# Patient Record
Sex: FEMALE | Race: WHITE | NOT HISPANIC OR LATINO | Employment: OTHER | URBAN - METROPOLITAN AREA
[De-identification: names, ages, dates, MRNs, and addresses within clinical notes are randomized per-mention and may not be internally consistent; named-entity substitution may affect disease eponyms.]

---

## 2021-07-19 ENCOUNTER — HOSPITAL ENCOUNTER (INPATIENT)
Facility: MEDICAL CENTER | Age: 71
LOS: 2 days | DRG: 872 | End: 2021-07-21
Attending: EMERGENCY MEDICINE | Admitting: STUDENT IN AN ORGANIZED HEALTH CARE EDUCATION/TRAINING PROGRAM
Payer: COMMERCIAL

## 2021-07-19 ENCOUNTER — APPOINTMENT (OUTPATIENT)
Dept: RADIOLOGY | Facility: MEDICAL CENTER | Age: 71
DRG: 872 | End: 2021-07-19
Attending: EMERGENCY MEDICINE
Payer: COMMERCIAL

## 2021-07-19 DIAGNOSIS — Z85.79 HISTORY OF MULTIPLE MYELOMA: ICD-10-CM

## 2021-07-19 DIAGNOSIS — N39.0 ACUTE UTI: ICD-10-CM

## 2021-07-19 DIAGNOSIS — A41.9 SEPSIS WITHOUT ACUTE ORGAN DYSFUNCTION, DUE TO UNSPECIFIED ORGANISM (HCC): ICD-10-CM

## 2021-07-19 PROBLEM — C90.00 MULTIPLE MYELOMA (HCC): Status: ACTIVE | Noted: 2021-07-19

## 2021-07-19 PROBLEM — E66.9 OBESITY (BMI 35.0-39.9 WITHOUT COMORBIDITY): Status: ACTIVE | Noted: 2021-07-19

## 2021-07-19 PROBLEM — E66.09 CLASS 2 OBESITY DUE TO EXCESS CALORIES WITHOUT SERIOUS COMORBIDITY WITH BODY MASS INDEX (BMI) OF 35.0 TO 35.9 IN ADULT: Status: RESOLVED | Noted: 2021-07-19 | Resolved: 2021-07-19

## 2021-07-19 PROBLEM — N30.00 ACUTE CYSTITIS: Status: ACTIVE | Noted: 2021-07-19

## 2021-07-19 PROBLEM — D72.819 LEUKOPENIA: Status: ACTIVE | Noted: 2021-07-19

## 2021-07-19 PROBLEM — Z79.4 TYPE 2 DIABETES MELLITUS WITH DIABETIC POLYNEUROPATHY, WITH LONG-TERM CURRENT USE OF INSULIN (HCC): Status: ACTIVE | Noted: 2021-07-19

## 2021-07-19 PROBLEM — D69.6 THROMBOCYTOPENIA (HCC): Status: ACTIVE | Noted: 2021-07-19

## 2021-07-19 PROBLEM — R06.02 SHORTNESS OF BREATH: Status: ACTIVE | Noted: 2021-07-19

## 2021-07-19 PROBLEM — R74.01 TRANSAMINITIS: Status: ACTIVE | Noted: 2021-07-19

## 2021-07-19 PROBLEM — E66.09 CLASS 2 OBESITY DUE TO EXCESS CALORIES WITHOUT SERIOUS COMORBIDITY WITH BODY MASS INDEX (BMI) OF 35.0 TO 35.9 IN ADULT: Status: ACTIVE | Noted: 2021-07-19

## 2021-07-19 PROBLEM — E11.42 TYPE 2 DIABETES MELLITUS WITH DIABETIC POLYNEUROPATHY, WITH LONG-TERM CURRENT USE OF INSULIN (HCC): Status: ACTIVE | Noted: 2021-07-19

## 2021-07-19 PROBLEM — E87.20 METABOLIC ACIDOSIS: Status: ACTIVE | Noted: 2021-07-19

## 2021-07-19 LAB
ALBUMIN SERPL BCP-MCNC: 4.2 G/DL (ref 3.2–4.9)
ALBUMIN/GLOB SERPL: 1.6 G/DL
ALP SERPL-CCNC: 93 U/L (ref 30–99)
ALT SERPL-CCNC: 58 U/L (ref 2–50)
ANION GAP SERPL CALC-SCNC: 18 MMOL/L (ref 7–16)
APPEARANCE UR: ABNORMAL
AST SERPL-CCNC: 48 U/L (ref 12–45)
BACTERIA #/AREA URNS HPF: ABNORMAL /HPF
BASOPHILS # BLD AUTO: 0.2 % (ref 0–1.8)
BASOPHILS # BLD: 0.01 K/UL (ref 0–0.12)
BILIRUB SERPL-MCNC: 1.3 MG/DL (ref 0.1–1.5)
BILIRUB UR QL STRIP.AUTO: NEGATIVE
BUN SERPL-MCNC: 13 MG/DL (ref 8–22)
CALCIUM SERPL-MCNC: 9 MG/DL (ref 8.5–10.5)
CHLORIDE SERPL-SCNC: 102 MMOL/L (ref 96–112)
CO2 SERPL-SCNC: 16 MMOL/L (ref 20–33)
COLOR UR: YELLOW
CREAT SERPL-MCNC: 0.94 MG/DL (ref 0.5–1.4)
EOSINOPHIL # BLD AUTO: 0.16 K/UL (ref 0–0.51)
EOSINOPHIL NFR BLD: 4 % (ref 0–6.9)
EPI CELLS #/AREA URNS HPF: NEGATIVE /HPF
ERYTHROCYTE [DISTWIDTH] IN BLOOD BY AUTOMATED COUNT: 54.5 FL (ref 35.9–50)
EST. AVERAGE GLUCOSE BLD GHB EST-MCNC: 91 MG/DL
FLUAV RNA SPEC QL NAA+PROBE: NEGATIVE
FLUBV RNA SPEC QL NAA+PROBE: NEGATIVE
GLOBULIN SER CALC-MCNC: 2.7 G/DL (ref 1.9–3.5)
GLUCOSE SERPL-MCNC: 133 MG/DL (ref 65–99)
GLUCOSE UR STRIP.AUTO-MCNC: NEGATIVE MG/DL
HBA1C MFR BLD: 4.8 % (ref 4–5.6)
HCT VFR BLD AUTO: 37.6 % (ref 37–47)
HGB BLD-MCNC: 12.8 G/DL (ref 12–16)
HYALINE CASTS #/AREA URNS LPF: ABNORMAL /LPF
IMM GRANULOCYTES # BLD AUTO: 0.03 K/UL (ref 0–0.11)
IMM GRANULOCYTES NFR BLD AUTO: 0.7 % (ref 0–0.9)
KETONES UR STRIP.AUTO-MCNC: ABNORMAL MG/DL
LACTATE BLD-SCNC: 2.8 MMOL/L (ref 0.5–2)
LEUKOCYTE ESTERASE UR QL STRIP.AUTO: ABNORMAL
LYMPHOCYTES # BLD AUTO: 0.56 K/UL (ref 1–4.8)
LYMPHOCYTES NFR BLD: 14 % (ref 22–41)
MAGNESIUM SERPL-MCNC: 1.8 MG/DL (ref 1.5–2.5)
MCH RBC QN AUTO: 32.2 PG (ref 27–33)
MCHC RBC AUTO-ENTMCNC: 34 G/DL (ref 33.6–35)
MCV RBC AUTO: 94.5 FL (ref 81.4–97.8)
MICRO URNS: ABNORMAL
MONOCYTES # BLD AUTO: 0.69 K/UL (ref 0–0.85)
MONOCYTES NFR BLD AUTO: 17.2 % (ref 0–13.4)
NEUTROPHILS # BLD AUTO: 2.56 K/UL (ref 2–7.15)
NEUTROPHILS NFR BLD: 63.9 % (ref 44–72)
NITRITE UR QL STRIP.AUTO: NEGATIVE
NRBC # BLD AUTO: 0 K/UL
NRBC BLD-RTO: 0 /100 WBC
PH UR STRIP.AUTO: 5.5 [PH] (ref 5–8)
PLATELET # BLD AUTO: 102 K/UL (ref 164–446)
PMV BLD AUTO: 11.6 FL (ref 9–12.9)
POTASSIUM SERPL-SCNC: 3.7 MMOL/L (ref 3.6–5.5)
PROCALCITONIN SERPL-MCNC: 0.08 NG/ML
PROT SERPL-MCNC: 6.9 G/DL (ref 6–8.2)
PROT UR QL STRIP: 30 MG/DL
RBC # BLD AUTO: 3.98 M/UL (ref 4.2–5.4)
RBC # URNS HPF: ABNORMAL /HPF
RBC UR QL AUTO: ABNORMAL
RSV RNA SPEC QL NAA+PROBE: NEGATIVE
SARS-COV-2 RNA RESP QL NAA+PROBE: NOTDETECTED
SODIUM SERPL-SCNC: 136 MMOL/L (ref 135–145)
SP GR UR STRIP.AUTO: 1.01
SPECIMEN SOURCE: NORMAL
UROBILINOGEN UR STRIP.AUTO-MCNC: 0.2 MG/DL
WBC # BLD AUTO: 4 K/UL (ref 4.8–10.8)
WBC #/AREA URNS HPF: ABNORMAL /HPF

## 2021-07-19 PROCEDURE — 99223 1ST HOSP IP/OBS HIGH 75: CPT | Performed by: STUDENT IN AN ORGANIZED HEALTH CARE EDUCATION/TRAINING PROGRAM

## 2021-07-19 PROCEDURE — 87086 URINE CULTURE/COLONY COUNT: CPT

## 2021-07-19 PROCEDURE — 83735 ASSAY OF MAGNESIUM: CPT

## 2021-07-19 PROCEDURE — 87077 CULTURE AEROBIC IDENTIFY: CPT

## 2021-07-19 PROCEDURE — 700111 HCHG RX REV CODE 636 W/ 250 OVERRIDE (IP): Performed by: EMERGENCY MEDICINE

## 2021-07-19 PROCEDURE — 83605 ASSAY OF LACTIC ACID: CPT | Mod: 91

## 2021-07-19 PROCEDURE — 87186 SC STD MICRODIL/AGAR DIL: CPT

## 2021-07-19 PROCEDURE — 700105 HCHG RX REV CODE 258: Performed by: EMERGENCY MEDICINE

## 2021-07-19 PROCEDURE — 0241U HCHG SARS-COV-2 COVID-19 NFCT DS RESP RNA 4 TRGT MIC: CPT

## 2021-07-19 PROCEDURE — 99285 EMERGENCY DEPT VISIT HI MDM: CPT

## 2021-07-19 PROCEDURE — 80053 COMPREHEN METABOLIC PANEL: CPT

## 2021-07-19 PROCEDURE — 83036 HEMOGLOBIN GLYCOSYLATED A1C: CPT

## 2021-07-19 PROCEDURE — 96365 THER/PROPH/DIAG IV INF INIT: CPT

## 2021-07-19 PROCEDURE — 770006 HCHG ROOM/CARE - MED/SURG/GYN SEMI*

## 2021-07-19 PROCEDURE — 87040 BLOOD CULTURE FOR BACTERIA: CPT

## 2021-07-19 PROCEDURE — 81001 URINALYSIS AUTO W/SCOPE: CPT

## 2021-07-19 PROCEDURE — 85025 COMPLETE CBC W/AUTO DIFF WBC: CPT

## 2021-07-19 PROCEDURE — C9803 HOPD COVID-19 SPEC COLLECT: HCPCS | Performed by: EMERGENCY MEDICINE

## 2021-07-19 PROCEDURE — 84145 PROCALCITONIN (PCT): CPT

## 2021-07-19 PROCEDURE — 71045 X-RAY EXAM CHEST 1 VIEW: CPT

## 2021-07-19 PROCEDURE — 96375 TX/PRO/DX INJ NEW DRUG ADDON: CPT

## 2021-07-19 RX ORDER — LENALIDOMIDE 10 MG/1
10 CAPSULE ORAL
COMMUNITY

## 2021-07-19 RX ORDER — GABAPENTIN 300 MG/1
300 CAPSULE ORAL
Status: DISCONTINUED | OUTPATIENT
Start: 2021-07-19 | End: 2021-07-21 | Stop reason: HOSPADM

## 2021-07-19 RX ORDER — SODIUM CHLORIDE, SODIUM LACTATE, POTASSIUM CHLORIDE, CALCIUM CHLORIDE 600; 310; 30; 20 MG/100ML; MG/100ML; MG/100ML; MG/100ML
INJECTION, SOLUTION INTRAVENOUS CONTINUOUS
Status: ACTIVE | OUTPATIENT
Start: 2021-07-19 | End: 2021-07-20

## 2021-07-19 RX ORDER — INSULIN GLARGINE 100 [IU]/ML
7 INJECTION, SOLUTION SUBCUTANEOUS EVERY EVENING
Status: DISCONTINUED | OUTPATIENT
Start: 2021-07-20 | End: 2021-07-20

## 2021-07-19 RX ORDER — OXYCODONE HYDROCHLORIDE 5 MG/1
5 TABLET ORAL EVERY 4 HOURS PRN
Status: DISCONTINUED | OUTPATIENT
Start: 2021-07-19 | End: 2021-07-21 | Stop reason: HOSPADM

## 2021-07-19 RX ORDER — LETROZOLE 2.5 MG/1
2.5 TABLET, FILM COATED ORAL EVERY EVENING
Status: DISCONTINUED | OUTPATIENT
Start: 2021-07-20 | End: 2021-07-21 | Stop reason: HOSPADM

## 2021-07-19 RX ORDER — AMOXICILLIN 250 MG
2 CAPSULE ORAL 2 TIMES DAILY
Status: DISCONTINUED | OUTPATIENT
Start: 2021-07-20 | End: 2021-07-21 | Stop reason: HOSPADM

## 2021-07-19 RX ORDER — DEXTROSE MONOHYDRATE 25 G/50ML
50 INJECTION, SOLUTION INTRAVENOUS
Status: DISCONTINUED | OUTPATIENT
Start: 2021-07-19 | End: 2021-07-21 | Stop reason: HOSPADM

## 2021-07-19 RX ORDER — POLYETHYLENE GLYCOL 3350 17 G/17G
1 POWDER, FOR SOLUTION ORAL
Status: DISCONTINUED | OUTPATIENT
Start: 2021-07-19 | End: 2021-07-21 | Stop reason: HOSPADM

## 2021-07-19 RX ORDER — SODIUM CHLORIDE, SODIUM LACTATE, POTASSIUM CHLORIDE, CALCIUM CHLORIDE 600; 310; 30; 20 MG/100ML; MG/100ML; MG/100ML; MG/100ML
1000 INJECTION, SOLUTION INTRAVENOUS ONCE
Status: COMPLETED | OUTPATIENT
Start: 2021-07-19 | End: 2021-07-19

## 2021-07-19 RX ORDER — LENALIDOMIDE 10 MG/1
10 CAPSULE ORAL
Status: DISCONTINUED | OUTPATIENT
Start: 2021-07-19 | End: 2021-07-19

## 2021-07-19 RX ORDER — LABETALOL HYDROCHLORIDE 5 MG/ML
10 INJECTION, SOLUTION INTRAVENOUS EVERY 4 HOURS PRN
Status: DISCONTINUED | OUTPATIENT
Start: 2021-07-19 | End: 2021-07-21 | Stop reason: HOSPADM

## 2021-07-19 RX ORDER — ENALAPRILAT 1.25 MG/ML
1.25 INJECTION INTRAVENOUS EVERY 6 HOURS PRN
Status: DISCONTINUED | OUTPATIENT
Start: 2021-07-19 | End: 2021-07-21 | Stop reason: HOSPADM

## 2021-07-19 RX ORDER — HYDROMORPHONE HYDROCHLORIDE 1 MG/ML
0.5 INJECTION, SOLUTION INTRAMUSCULAR; INTRAVENOUS; SUBCUTANEOUS
Status: DISCONTINUED | OUTPATIENT
Start: 2021-07-19 | End: 2021-07-21 | Stop reason: HOSPADM

## 2021-07-19 RX ORDER — BISACODYL 10 MG
10 SUPPOSITORY, RECTAL RECTAL
Status: DISCONTINUED | OUTPATIENT
Start: 2021-07-19 | End: 2021-07-21 | Stop reason: HOSPADM

## 2021-07-19 RX ORDER — INSULIN GLARGINE 100 [IU]/ML
7 INJECTION, SOLUTION SUBCUTANEOUS EVERY EVENING
COMMUNITY

## 2021-07-19 RX ORDER — LETROZOLE 2.5 MG/1
2.5 TABLET, FILM COATED ORAL EVERY EVENING
COMMUNITY

## 2021-07-19 RX ORDER — VITAMIN B COMPLEX
1000 TABLET ORAL EVERY EVENING
COMMUNITY

## 2021-07-19 RX ORDER — AZITHROMYCIN 500 MG/1
500 INJECTION, POWDER, LYOPHILIZED, FOR SOLUTION INTRAVENOUS ONCE
Status: COMPLETED | OUTPATIENT
Start: 2021-07-19 | End: 2021-07-19

## 2021-07-19 RX ORDER — GABAPENTIN 300 MG/1
300 CAPSULE ORAL
COMMUNITY

## 2021-07-19 RX ORDER — ACETAMINOPHEN 325 MG/1
650 TABLET ORAL EVERY 6 HOURS PRN
Status: DISCONTINUED | OUTPATIENT
Start: 2021-07-19 | End: 2021-07-21 | Stop reason: HOSPADM

## 2021-07-19 RX ADMIN — AZITHROMYCIN MONOHYDRATE 500 MG: 500 INJECTION, POWDER, LYOPHILIZED, FOR SOLUTION INTRAVENOUS at 22:42

## 2021-07-19 RX ADMIN — SODIUM CHLORIDE, POTASSIUM CHLORIDE, SODIUM LACTATE AND CALCIUM CHLORIDE 1000 ML: 600; 310; 30; 20 INJECTION, SOLUTION INTRAVENOUS at 22:48

## 2021-07-19 RX ADMIN — CEFTRIAXONE SODIUM 2 G: 10 INJECTION, POWDER, FOR SOLUTION INTRAVENOUS at 22:42

## 2021-07-19 ASSESSMENT — ENCOUNTER SYMPTOMS
INSOMNIA: 0
HEARTBURN: 0
BACK PAIN: 0
FOCAL WEAKNESS: 0
DOUBLE VISION: 0
FLANK PAIN: 0
VOMITING: 0
NAUSEA: 0
BRUISES/BLEEDS EASILY: 0
LOSS OF CONSCIOUSNESS: 0
SORE THROAT: 0
COUGH: 0
CHILLS: 1
NECK PAIN: 0
CONSTIPATION: 0
ABDOMINAL PAIN: 1
BLOOD IN STOOL: 0
DIARRHEA: 0
HEADACHES: 0
WHEEZING: 0
FEVER: 1
DEPRESSION: 0
WEAKNESS: 0
SHORTNESS OF BREATH: 1
BLURRED VISION: 0
PALPITATIONS: 0

## 2021-07-20 LAB
ALBUMIN SERPL BCP-MCNC: 3.9 G/DL (ref 3.2–4.9)
ALBUMIN/GLOB SERPL: 1.6 G/DL
ALP SERPL-CCNC: 82 U/L (ref 30–99)
ALT SERPL-CCNC: 52 U/L (ref 2–50)
ANION GAP SERPL CALC-SCNC: 14 MMOL/L (ref 7–16)
AST SERPL-CCNC: 37 U/L (ref 12–45)
BASOPHILS # BLD AUTO: 0.4 % (ref 0–1.8)
BASOPHILS # BLD: 0.01 K/UL (ref 0–0.12)
BILIRUB SERPL-MCNC: 0.6 MG/DL (ref 0.1–1.5)
BUN SERPL-MCNC: 12 MG/DL (ref 8–22)
CALCIUM SERPL-MCNC: 8.6 MG/DL (ref 8.5–10.5)
CHLORIDE SERPL-SCNC: 108 MMOL/L (ref 96–112)
CO2 SERPL-SCNC: 20 MMOL/L (ref 20–33)
CREAT SERPL-MCNC: 0.85 MG/DL (ref 0.5–1.4)
EOSINOPHIL # BLD AUTO: 0.11 K/UL (ref 0–0.51)
EOSINOPHIL NFR BLD: 3.9 % (ref 0–6.9)
ERYTHROCYTE [DISTWIDTH] IN BLOOD BY AUTOMATED COUNT: 56.9 FL (ref 35.9–50)
GLOBULIN SER CALC-MCNC: 2.4 G/DL (ref 1.9–3.5)
GLUCOSE BLD-MCNC: 100 MG/DL (ref 65–99)
GLUCOSE BLD-MCNC: 100 MG/DL (ref 65–99)
GLUCOSE BLD-MCNC: 118 MG/DL (ref 65–99)
GLUCOSE BLD-MCNC: 153 MG/DL (ref 65–99)
GLUCOSE SERPL-MCNC: 137 MG/DL (ref 65–99)
HCT VFR BLD AUTO: 34.6 % (ref 37–47)
HGB BLD-MCNC: 11.3 G/DL (ref 12–16)
IMM GRANULOCYTES # BLD AUTO: 0.02 K/UL (ref 0–0.11)
IMM GRANULOCYTES NFR BLD AUTO: 0.7 % (ref 0–0.9)
INR PPP: 1.07 (ref 0.87–1.13)
LACTATE BLD-SCNC: 1.4 MMOL/L (ref 0.5–2)
LACTATE BLD-SCNC: 1.7 MMOL/L (ref 0.5–2)
LACTATE BLD-SCNC: 2 MMOL/L (ref 0.5–2)
LACTATE BLD-SCNC: 2.4 MMOL/L (ref 0.5–2)
LYMPHOCYTES # BLD AUTO: 0.52 K/UL (ref 1–4.8)
LYMPHOCYTES NFR BLD: 18.5 % (ref 22–41)
MCH RBC QN AUTO: 31.7 PG (ref 27–33)
MCHC RBC AUTO-ENTMCNC: 32.7 G/DL (ref 33.6–35)
MCV RBC AUTO: 96.9 FL (ref 81.4–97.8)
MONOCYTES # BLD AUTO: 0.42 K/UL (ref 0–0.85)
MONOCYTES NFR BLD AUTO: 14.9 % (ref 0–13.4)
NEUTROPHILS # BLD AUTO: 1.73 K/UL (ref 2–7.15)
NEUTROPHILS NFR BLD: 61.6 % (ref 44–72)
NRBC # BLD AUTO: 0 K/UL
NRBC BLD-RTO: 0 /100 WBC
PLATELET # BLD AUTO: 90 K/UL (ref 164–446)
PMV BLD AUTO: 11.6 FL (ref 9–12.9)
POTASSIUM SERPL-SCNC: 3.5 MMOL/L (ref 3.6–5.5)
PROT SERPL-MCNC: 6.3 G/DL (ref 6–8.2)
PROTHROMBIN TIME: 13.6 SEC (ref 12–14.6)
RBC # BLD AUTO: 3.57 M/UL (ref 4.2–5.4)
SODIUM SERPL-SCNC: 142 MMOL/L (ref 135–145)
WBC # BLD AUTO: 2.8 K/UL (ref 4.8–10.8)

## 2021-07-20 PROCEDURE — 700111 HCHG RX REV CODE 636 W/ 250 OVERRIDE (IP): Performed by: STUDENT IN AN ORGANIZED HEALTH CARE EDUCATION/TRAINING PROGRAM

## 2021-07-20 PROCEDURE — 82962 GLUCOSE BLOOD TEST: CPT | Mod: 91

## 2021-07-20 PROCEDURE — A9270 NON-COVERED ITEM OR SERVICE: HCPCS | Performed by: STUDENT IN AN ORGANIZED HEALTH CARE EDUCATION/TRAINING PROGRAM

## 2021-07-20 PROCEDURE — 700111 HCHG RX REV CODE 636 W/ 250 OVERRIDE (IP): Performed by: INTERNAL MEDICINE

## 2021-07-20 PROCEDURE — A9270 NON-COVERED ITEM OR SERVICE: HCPCS | Performed by: INTERNAL MEDICINE

## 2021-07-20 PROCEDURE — 99232 SBSQ HOSP IP/OBS MODERATE 35: CPT | Performed by: INTERNAL MEDICINE

## 2021-07-20 PROCEDURE — 36415 COLL VENOUS BLD VENIPUNCTURE: CPT

## 2021-07-20 PROCEDURE — 700102 HCHG RX REV CODE 250 W/ 637 OVERRIDE(OP): Performed by: INTERNAL MEDICINE

## 2021-07-20 PROCEDURE — 85610 PROTHROMBIN TIME: CPT

## 2021-07-20 PROCEDURE — 94760 N-INVAS EAR/PLS OXIMETRY 1: CPT

## 2021-07-20 PROCEDURE — 700102 HCHG RX REV CODE 250 W/ 637 OVERRIDE(OP): Performed by: STUDENT IN AN ORGANIZED HEALTH CARE EDUCATION/TRAINING PROGRAM

## 2021-07-20 PROCEDURE — 85025 COMPLETE CBC W/AUTO DIFF WBC: CPT

## 2021-07-20 PROCEDURE — 83605 ASSAY OF LACTIC ACID: CPT | Mod: 91

## 2021-07-20 PROCEDURE — 770006 HCHG ROOM/CARE - MED/SURG/GYN SEMI*

## 2021-07-20 PROCEDURE — 80053 COMPREHEN METABOLIC PANEL: CPT

## 2021-07-20 PROCEDURE — 700105 HCHG RX REV CODE 258: Performed by: STUDENT IN AN ORGANIZED HEALTH CARE EDUCATION/TRAINING PROGRAM

## 2021-07-20 RX ORDER — POTASSIUM CHLORIDE 20 MEQ/1
40 TABLET, EXTENDED RELEASE ORAL ONCE
Status: COMPLETED | OUTPATIENT
Start: 2021-07-20 | End: 2021-07-20

## 2021-07-20 RX ORDER — MAGNESIUM SULFATE HEPTAHYDRATE 40 MG/ML
2 INJECTION, SOLUTION INTRAVENOUS ONCE
Status: COMPLETED | OUTPATIENT
Start: 2021-07-20 | End: 2021-07-20

## 2021-07-20 RX ADMIN — LETROZOLE 2.5 MG: 2.5 TABLET ORAL at 18:41

## 2021-07-20 RX ADMIN — ENOXAPARIN SODIUM 40 MG: 40 INJECTION SUBCUTANEOUS at 06:41

## 2021-07-20 RX ADMIN — SODIUM CHLORIDE, POTASSIUM CHLORIDE, SODIUM LACTATE AND CALCIUM CHLORIDE: 600; 310; 30; 20 INJECTION, SOLUTION INTRAVENOUS at 00:15

## 2021-07-20 RX ADMIN — POTASSIUM CHLORIDE 40 MEQ: 1500 TABLET, EXTENDED RELEASE ORAL at 11:44

## 2021-07-20 RX ADMIN — MAGNESIUM SULFATE HEPTAHYDRATE 2 G: 2 INJECTION, SOLUTION INTRAVENOUS at 13:45

## 2021-07-20 RX ADMIN — GABAPENTIN 300 MG: 300 CAPSULE ORAL at 21:25

## 2021-07-20 RX ADMIN — CEFTRIAXONE SODIUM 2 G: 10 INJECTION, POWDER, FOR SOLUTION INTRAVENOUS at 21:25

## 2021-07-20 ASSESSMENT — COGNITIVE AND FUNCTIONAL STATUS - GENERAL
MOBILITY SCORE: 23
DAILY ACTIVITIY SCORE: 24
SUGGESTED CMS G CODE MODIFIER DAILY ACTIVITY: CH
CLIMB 3 TO 5 STEPS WITH RAILING: A LITTLE
SUGGESTED CMS G CODE MODIFIER MOBILITY: CI

## 2021-07-20 ASSESSMENT — PATIENT HEALTH QUESTIONNAIRE - PHQ9
2. FEELING DOWN, DEPRESSED, IRRITABLE, OR HOPELESS: NOT AT ALL
1. LITTLE INTEREST OR PLEASURE IN DOING THINGS: NOT AT ALL
SUM OF ALL RESPONSES TO PHQ9 QUESTIONS 1 AND 2: 0

## 2021-07-20 ASSESSMENT — LIFESTYLE VARIABLES
TOTAL SCORE: 0
HAVE PEOPLE ANNOYED YOU BY CRITICIZING YOUR DRINKING: NO
TOTAL SCORE: 0
CONSUMPTION TOTAL: NEGATIVE
EVER HAD A DRINK FIRST THING IN THE MORNING TO STEADY YOUR NERVES TO GET RID OF A HANGOVER: NO
AVERAGE NUMBER OF DAYS PER WEEK YOU HAVE A DRINK CONTAINING ALCOHOL: 0
ALCOHOL_USE: NO
DOES PATIENT WANT TO STOP DRINKING: NO
HOW MANY TIMES IN THE PAST YEAR HAVE YOU HAD 5 OR MORE DRINKS IN A DAY: 0
EVER FELT BAD OR GUILTY ABOUT YOUR DRINKING: NO
ON A TYPICAL DAY WHEN YOU DRINK ALCOHOL HOW MANY DRINKS DO YOU HAVE: 0
HAVE YOU EVER FELT YOU SHOULD CUT DOWN ON YOUR DRINKING: NO
TOTAL SCORE: 0

## 2021-07-20 NOTE — ED NOTES
Med rec updated and complete. Allergies reviewed. VIA interview with pt at bedside. Pt denies antibiotic use in last 30 days     Last doses of medications  07/19/21 2030.  Family will bring in REVLIMID if needed.    Home [facility]   [phone]           Current Outpatient Medications on File Prior to Encounter   Medication Sig Dispense Refill   • letrozole (FEMARA) 2.5 MG Tab Take 2.5 mg by mouth every evening.     • gabapentin (NEURONTIN) 300 MG Cap Take 300 mg by mouth at bedtime.     • Lenalidomide (REVLIMID) 10 MG Cap Take 10 mg by mouth at bedtime.     • insulin lispro (HUMALOG) 100 UNIT/ML Inject 3 Units under the skin 4 times a day as needed for High Blood Sugar. For FSBS > 120     • insulin glargine (LANTUS) 100 UNIT/ML Solution Inject 7 Units under the skin every evening.     • Calcium-Magnesium-Vitamin D (CALCIUM MAGNESIUM PO) Take 1 tablet by mouth 2 times a day.     • vitamin D (CHOLECALCIFEROL) 1000 Unit (25 mcg) Tab Take 1,000 Units by mouth every evening.

## 2021-07-20 NOTE — CARE PLAN
Problem: Knowledge Deficit - Standard  Goal: Patient and family/care givers will demonstrate understanding of plan of care, disease process/condition, diagnostic tests and medications  Outcome: Progressing   The patient is Stable - Low risk of patient condition declining or worsening         Progress made toward(s) clinical / shift goals:  Went over POC.    Patient is not progressing towards the following goals:

## 2021-07-20 NOTE — H&P
Hospital Medicine History & Physical Note    Date of Service  7/19/2021    Primary Care Physician  No primary care provider on file.    Consultants  None    Code Status  Full Code    Chief Complaint  Chief Complaint   Patient presents with   • Fever     pt states she had a 101 fever at home, pt has multiple myeloma, had a bone marrow transplant in october.    • Difficulty Urinating     pt states for a week she has been having difficulting urinating, pt states hx of utis.    • Painful Urination   • Diarrhea     pt states this has been happening for a month       History of Presenting Illness  Bridget Soto is a 71 y.o. female who presented 7/19/2021 with complaints of dysuria, fever of 101 at home and dehydration.  Patient states that she has been complaining of fever and chills which brought her into the hospital.  She states she has a history of multiple myeloma and is treated in [place].  She has not been taking any recent antibiotic therapy.  Patient states that she has been vaccinated x2 for COVID-19.  She denies any anosmia, ageusia, night sweats, cough with sputum production however does admit to shortness of breath.  She had chest x-ray performed and reveals hazy interstitial right midlung density suggests subtle infiltrate.  She denies cough with sputum production and hemoptysis.  She states that she has low platelets chronically from her multiple myeloma treatment.  She otherwise denies at time of evaluation: Constipation, melena, hematochezia, hematuria, incoordination, vertigo, syncope, visual changes.  Patient does complain of sore on the right cheek on the inside of her mouth  (see picture below), denies any trauma to inside of mouth.      Vital signs at time of admission were as follows: 96.6, 110, 18, 130/79, 97% room air.    Labs were obtained on admission and white blood cell count of 4.0, platelet 102 and otherwise relatively unremarkable.  Lactic acid was noted to be 2.8 and urinalysis  reveals cloudy urine with somewhat concentrated urine with specific gravity 1.015 and large leukocyte Estrace with trace occult blood, packed pyuria on microscopic analysis with moderate bacteria quantified.    Patient does have anion gap metabolic acidosis with anion gap of 18 CO2 of 16 on CMP and also has mild transaminitis with AST of 48 and ALT of 58 otherwise relatively unremarkable CMP.  Influenza A/B/RSV/Covid were tested for and negative.    Blood cultures obtained in ED and IV antibiotics initiated.  ERP consulted with myself for admission for sepsis secondary to urinary tract infection.  Patient agrees to full CODE STATUS at time of evaluation and alert and oriented x4.  She will be optimized in ED and subsequently transferred to medical alicia floor for further optimization of medical management.    I discussed the plan of care with patient.    Review of Systems  Review of Systems   Constitutional: Positive for chills and fever.   HENT: Negative for congestion and sore throat.         Sore in mouth   Eyes: Negative for blurred vision and double vision.   Respiratory: Positive for shortness of breath. Negative for cough and wheezing.    Cardiovascular: Negative for chest pain and palpitations.   Gastrointestinal: Positive for abdominal pain. Negative for blood in stool, constipation, diarrhea, heartburn, melena, nausea and vomiting.   Genitourinary: Positive for dysuria. Negative for flank pain and frequency.   Musculoskeletal: Negative for back pain and neck pain.   Skin: Negative for itching and rash.   Neurological: Negative for focal weakness, loss of consciousness, weakness and headaches.   Endo/Heme/Allergies: Negative for environmental allergies. Does not bruise/bleed easily.   Psychiatric/Behavioral: Negative for depression. The patient does not have insomnia.        Past Medical History   has a past medical history of Cancer (HCC) and Diabetes (HCC).    Surgical History   has no past surgical  history on file.     Family History  family history includes Cancer in her mother; Heart Disease in her father.   Family history reviewed with patient. There is no family history that is pertinent to the chief complaint.     Social History   reports that she has never smoked. She has never used smokeless tobacco. She reports previous alcohol use. She reports that she does not use drugs.    Allergies  Allergies   Allergen Reactions   • Sulfa Drugs      rash       Medications  Prior to Admission Medications   Prescriptions Last Dose Informant Patient Reported? Taking?   Calcium-Magnesium-Vitamin D (CALCIUM MAGNESIUM PO) 7/19/2021 at 2030 Patient Yes Yes   Sig: Take 1 tablet by mouth 2 times a day.   Lenalidomide (REVLIMID) 10 MG Cap 7/19/2021 at 2030 Patient Yes Yes   Sig: Take 10 mg by mouth at bedtime.   gabapentin (NEURONTIN) 300 MG Cap 7/19/2021 at 2030 Patient Yes Yes   Sig: Take 300 mg by mouth at bedtime.   insulin glargine (LANTUS) 100 UNIT/ML Solution 7/19/2021 at 2030 Patient Yes Yes   Sig: Inject 7 Units under the skin every evening.   insulin lispro (HUMALOG) 100 UNIT/ML 7/19/2021 at 0800 Patient Yes Yes   Sig: Inject 3 Units under the skin 4 times a day as needed for High Blood Sugar. For FSBS > 120   letrozole (FEMARA) 2.5 MG Tab 7/19/2021 at 2030 Patient Yes Yes   Sig: Take 2.5 mg by mouth every evening.   vitamin D (CHOLECALCIFEROL) 1000 Unit (25 mcg) Tab 7/19/2021 at 2030 Patient Yes Yes   Sig: Take 1,000 Units by mouth every evening.      Facility-Administered Medications: None       Physical Exam  Temp:  [35.9 °C (96.6 °F)] 35.9 °C (96.6 °F)  Pulse:  [110] 110  Resp:  [18] 18  BP: (130)/(79) 130/79  SpO2:  [97 %] 97 %    Physical Exam  Constitutional:       General: She is not in acute distress.     Appearance: Normal appearance. She is not ill-appearing, toxic-appearing or diaphoretic.   HENT:      Head: Normocephalic and atraumatic.      Mouth/Throat:      Mouth: Mucous membranes are dry.       Pharynx: Oropharynx is clear. No posterior oropharyngeal erythema.      Comments: Oral lesion appreciated on right cheek consistent with purpura see image below  Eyes:      General: No scleral icterus.     Extraocular Movements: Extraocular movements intact.      Conjunctiva/sclera: Conjunctivae normal.      Pupils: Pupils are equal, round, and reactive to light.   Neck:      Vascular: No carotid bruit.   Cardiovascular:      Rate and Rhythm: Normal rate and regular rhythm.      Pulses: Normal pulses.      Heart sounds: Normal heart sounds. No murmur heard.   No friction rub. No gallop.    Pulmonary:      Effort: Pulmonary effort is normal.      Breath sounds: Normal breath sounds. No wheezing, rhonchi or rales.   Abdominal:      General: Abdomen is flat. Bowel sounds are normal. There is no distension.      Palpations: There is no mass.      Tenderness: There is abdominal tenderness (suprapubic tenderness). There is no rebound.   Musculoskeletal:         General: No swelling. Normal range of motion.      Cervical back: Normal range of motion.      Right lower leg: No edema.      Left lower leg: No edema.   Lymphadenopathy:      Cervical: No cervical adenopathy.   Skin:     General: Skin is warm and dry.      Capillary Refill: Capillary refill takes less than 2 seconds.      Findings: No erythema or rash.   Neurological:      General: No focal deficit present.      Mental Status: She is alert and oriented to person, place, and time. Mental status is at baseline.      Cranial Nerves: No cranial nerve deficit.      Sensory: No sensory deficit.      Motor: No weakness.   Psychiatric:         Mood and Affect: Mood normal.         Behavior: Behavior normal.         Laboratory:  Recent Labs     07/19/21 2127   WBC 4.0*   RBC 3.98*   HEMOGLOBIN 12.8   HEMATOCRIT 37.6   MCV 94.5   MCH 32.2   MCHC 34.0   RDW 54.5*   PLATELETCT 102*   MPV 11.6     Recent Labs     07/19/21 2127   SODIUM 136   POTASSIUM 3.7   CHLORIDE 102    CO2 16*   GLUCOSE 133*   BUN 13   CREATININE 0.94   CALCIUM 9.0     Recent Labs     07/19/21 2127   ALTSGPT 58*   ASTSGOT 48*   ALKPHOSPHAT 93   TBILIRUBIN 1.3   GLUCOSE 133*         No results for input(s): NTPROBNP in the last 72 hours.      No results for input(s): TROPONINT in the last 72 hours.    Imaging:  DX-CHEST-PORTABLE (1 VIEW)   Final Result         1.  Hazy interstitial right midlung density suggests subtle infiltrate.        I reviewed and interpreted the above chest x-ray and appreciate somewhat flattened diaphragm, possible infiltrative process on right costophrenic angle as seen above.  Lytic lesions appreciated on ribs.        Assessment/Plan:  I anticipate this patient will require at least two midnights for appropriate medical management, necessitating inpatient admission.    * Sepsis (HCC)- (present on admission)  Assessment & Plan  This is Sepsis Present on admission  SIRS criteria identified on my evaluation include: Hypothermia, with temperature less than 96.8 deg F, Tachycardia, with heart rate greater than 90 BPM and Leukopenia, with WBC less than 4,000  Source is Urinary  Sepsis protocol initiated  Fluid resuscitation ordered per protocol  IV antibiotics as appropriate for source of sepsis  While organ dysfunction may be noted elsewhere in this problem list or in the chart, degree of organ dysfunction does not meet CMS criteria for severe sepsis  Procalcitonin ordered and pending, urine culture ordered and pending.  IV antibiotics initiated in ED and we will continue onward.        Acute cystitis- (present on admission)  Assessment & Plan  Urinalysis reveals significant pyuria and moderate bacteria on microscopic analysis consistent with symptomatic dysuria and infection  IV antibiotics initiated in ED  Urine culture ordered and pending result    Shortness of breath- (present on admission)  Assessment & Plan  Azithromycin given in ED  Procalcitonin ordered pending  Chest x-ray reveals  ill-defined opacity in right costophrenic border    Obesity (BMI 35.0-39.9 without comorbidity)- (present on admission)  Assessment & Plan  Recommend lifestyle modifications and follow-up with PCP for diet and exercise regimen.    Type 2 diabetes mellitus with diabetic polyneuropathy, with long-term current use of insulin (HCC)- (present on admission)  Assessment & Plan  Hemoglobin A1c ordered and pending  Consider daily aspirin  Continue home insulin regimen  Low SSI with meals  Follow-up outpatient PCP and endocrinology.    Transaminitis- (present on admission)  Assessment & Plan  Possibly secondary from sepsis and we will trend CMP in a.m.    Metabolic acidosis- (present on admission)  Assessment & Plan  Likely from lactic acidosis of 2.8.  Lactated Ringer infusion  CMP in a.m.    Leukopenia- (present on admission)  Assessment & Plan  Likely secondary to her chemotherapeutic agent we will hold off use of this in hospital at this time.  CBC in a.m.    Thrombocytopenia (HCC)- (present on admission)  Assessment & Plan  Likely secondary to her chemotherapeutic agent and we will hold off at this time  CBC in a.m.    Multiple myeloma (HCC)- (present on admission)  Assessment & Plan  Follow-up outpatient oncology, PCP after discharge.  We will hold her chemotherapeutic agent at this time for infection.      VTE prophylaxis: enoxaparin ppx

## 2021-07-20 NOTE — PROGRESS NOTES
"Patient arrived on unit with transport and spouse. Patient was able to safely ambulate onto bed. Patient is AOx4, declines any nausea/vomiting or pain at this time. Patient does report some shortness of breath which per patient \"happens on and off\". Vital signs stable. Updated patient and spouse on plan of care, no futher questions at this time. Bed locked and in lowest position, call light and personal belonging within reach.   "

## 2021-07-20 NOTE — ED PROVIDER NOTES
ED Provider Note    CHIEF COMPLAINT  Chief Complaint   Patient presents with   • Fever     pt states she had a 101 fever at home, pt has multiple myeloma, had a bone marrow transplant in october.    • Difficulty Urinating     pt states for a week she has been having difficulting urinating, pt states hx of utis.    • Painful Urination   • Diarrhea     pt states this has been happening for a month       HPI  Bridget Soto is a 71 y.o. female who presents with fever up to over 101 today at home.  Also with dysuria for the past few days.  Has a prior history of UTIs -last episode was about a year ago.  No recent antibiotic exposure.  Had a couple episodes of vomiting earlier this evening after dinner.  Has had chronic diarrhea over the past few months.    No chest pain or trouble breathing.  No cough or sore throat.  No skin changes or rashes.  No abdominal pain or vomiting.  Patient suspects that she might have another urinary tract infection.    Patient is currently traveling from [facility].    Patient has a prior history of bone marrow transplantation last fall due to multiple myeloma.  She is currently on Revlimid daily for continued multiple myeloma treatment.  Last dose was this evening.She has been vaccinated for Covid.  She does have history of diabetes.      REVIEW OF SYSTEMS  See HPI for further details. All other systems are negative.     PAST MEDICAL HISTORY   has a past medical history of Cancer (HCC) and Diabetes (HCC).    SOCIAL HISTORY  Social History     Tobacco Use   • Smoking status: Never Smoker   • Smokeless tobacco: Never Used   Vaping Use   • Vaping Use: Never used   Substance and Sexual Activity   • Alcohol use: Not Currently   • Drug use: Never   • Sexual activity: Not on file       SURGICAL HISTORY  patient denies any surgical history    CURRENT MEDICATIONS  Home Medications     Reviewed by Ruthy Ku R.N. (Registered Nurse) on 07/19/21 at 2040  Med List Status: Partial  "  Medication Last Dose Status        Patient Hiren Taking any Medications                       ALLERGIES  Allergies   Allergen Reactions   • Sulfa Drugs      rash       PHYSICAL EXAM  VITAL SIGNS: /79   Pulse (!) 110   Temp 35.9 °C (96.6 °F) (Temporal)   Resp 18   Ht 1.651 m (5' 5\")   Wt 104 kg (230 lb)   SpO2 97%   BMI 38.27 kg/m²   Pulse ox interpretation: I interpret this pulse ox as normal.  Constitutional: Alert, diaphoretic.  HENT: No signs of trauma, Bilateral external ears normal, Nose normal.   Eyes: Pupils are equal and reactive, Conjunctiva normal, Non-icteric.   Neck: Normal range of motion, No tenderness, Supple, No stridor.   Cardiovascular: Regular rate and rhythm.   Thorax & Lungs: Normal breath sounds, No respiratory distress, No wheezing, No chest tenderness.   Abdomen: Bowel sounds normal, Soft, No tenderness, No masses, No pulsatile masses. No peritoneal signs.  Skin: Warm, Dry, No erythema, No rash.   Back: No bony tenderness, No CVA tenderness.   Extremities: Intact distal pulses, No edema, No tenderness, No cyanosis  Musculoskeletal: Good range of motion in all major joints. No tenderness to palpation or major deformities noted.   Neurologic: Alert, No focal deficits noted.       DIAGNOSTIC STUDIES / PROCEDURES    EKG - Physician interpretation  No results found for this or any previous visit.    LABS  Labs Reviewed   URINALYSIS - Abnormal; Notable for the following components:       Result Value    Character Cloudy (*)     Ketones Trace (*)     Protein 30 (*)     Leukocyte Esterase Large (*)     Occult Blood Trace (*)     All other components within normal limits   LACTIC ACID - Abnormal; Notable for the following components:    Lactic Acid 2.8 (*)     All other components within normal limits   CBC WITH DIFFERENTIAL - Abnormal; Notable for the following components:    WBC 4.0 (*)     RBC 3.98 (*)     RDW 54.5 (*)     Platelet Count 102 (*)     Lymphocytes 14.00 (*)     " "Monocytes 17.20 (*)     Lymphs (Absolute) 0.56 (*)     All other components within normal limits   COMP METABOLIC PANEL - Abnormal; Notable for the following components:    Co2 16 (*)     Anion Gap 18.0 (*)     Glucose 133 (*)     AST(SGOT) 48 (*)     ALT(SGPT) 58 (*)     All other components within normal limits   URINE MICROSCOPIC (W/UA) - Abnormal; Notable for the following components:    WBC Packed (*)     Bacteria Moderate (*)     All other components within normal limits   ESTIMATED GFR - Abnormal; Notable for the following components:    GFR If Non  59 (*)     All other components within normal limits   BLOOD CULTURE    Narrative:     Per Hospital Policy: Only change Specimen Src: to \"Line\" if  specified by physician order.   COV-2, FLU A/B, AND RSV BY PCR    Narrative:     Have you been in close contact with a person who is suspected  or known to be positive for COVID-19 within the last 30 days  (e.g. last seen that person < 30 days ago)->Unknown   LACTIC ACID   LACTIC ACID   URINALYSIS   URINE CULTURE(NEW)   BLOOD CULTURE   COV-2, FLU A/B, AND RSV BY PCR         RADIOLOGY  DX-CHEST-PORTABLE (1 VIEW)   Final Result         1.  Hazy interstitial right midlung density suggests subtle infiltrate.            COURSE & MEDICAL DECISION MAKING    Medications   cefTRIAXone (Rocephin) syringe 2 g (has no administration in time range)   azithromycin (ZITHROMAX) injection 500 mg (has no administration in time range)   lactated ringers infusion (BOLUS) (has no administration in time range)   senna-docusate (PERICOLACE or SENOKOT S) 8.6-50 MG per tablet 2 tablet (has no administration in time range)     And   polyethylene glycol/lytes (MIRALAX) PACKET 1 Packet (has no administration in time range)     And   magnesium hydroxide (MILK OF MAGNESIA) suspension 30 mL (has no administration in time range)     And   bisacodyl (DULCOLAX) suppository 10 mg (has no administration in time range)   lactated ringers " "infusion (has no administration in time range)   enoxaparin (LOVENOX) inj 40 mg (has no administration in time range)   acetaminophen (Tylenol) tablet 650 mg (has no administration in time range)   cefTRIAXone (Rocephin) syringe 2 g (has no administration in time range)   enalaprilat (VASOTEC) injection 1.25 mg (has no administration in time range)   labetalol (NORMODYNE/TRANDATE) injection 10 mg (has no administration in time range)       Pertinent Labs & Imaging studies reviewed. (See chart for details)  71 y.o. female with a prior history of diabetes and multiple myeloma status post bone marrow transplantation last fall, on oral chemotherapy maintenance with Revlimid, presenting with dysuria and concerns for urinary tract infection.  She had a fever at home over 101.  Had vomiting as well.  No fever upon arrival though slightly tachycardic.  She was given IV fluid hydration for this and concerns for sepsis.  Had improvement in heart rate.  She was started on IV antibiotics after urinalysis showed packed WBCs, large LE, and moderate bacteria concerning for urinary tract infection.  She also had slightly elevated lactic acid for which IV fluid hydration should help address.    Given the patient's underlying medical history, age, and presence of urinary tract infection in the setting of sepsis, the patient will be hospitalized.  Chest x-ray is concerning for possible right midlung infiltrate.  No hypoxia or respiratory distress.  The patient has been vaccinated against Covid.  Low suspicion for pulmonary pathology at this time in addition to a urinary tract infection.  She was given a dose of azithromycin to help cover atypical microbes however.    I spoke with Dr. Rose from the hospitalist service who is agreeable to the hospitalization.    /79   Pulse (!) 110   Temp 35.9 °C (96.6 °F) (Temporal)   Resp 18   Ht 1.651 m (5' 5\")   Wt 104 kg (230 lb)   SpO2 97%   BMI 38.27 kg/m²     The total critical " care time on this patient is 35 minutes, resuscitating patient, speaking with admitting physician, and deciphering test results. This 35 minutes is exclusive of separately billable procedures.      FINAL IMPRESSION  1. Sepsis without acute organ dysfunction, due to unspecified organism (HCC)    2. Acute UTI    3. History of multiple myeloma            Electronically signed by: Long Holguin M.D., 7/19/2021 9:10 PM

## 2021-07-20 NOTE — ASSESSMENT & PLAN NOTE
Urinalysis reveals significant pyuria and moderate bacteria on microscopic analysis consistent with symptomatic dysuria and infection  IV antibiotics initiated in ED  Urine culture ordered and pending result

## 2021-07-20 NOTE — DISCHARGE PLANNING
Anticipated Discharge Disposition: Home with spouse; no needs anticipated.    Action: LSW met with patient and spouse at bedside to complete assessment and to discuss discharge plan. Patient resides with spouse in [place] and is independent with ADLs and IADLs without DME. Patient and spouse currently staying at [facility] in [place]. Patient does have a 4WW, FWW, wheelchair, toilet seat riser and hospital bed available to her at home. Patient uses the [facility] pharmacy located on [facility] in [place]. Patient's spouse to assist with transportation home. Patient identified no needs or concerns with discharge home.    Barriers to Discharge: Medical clearance.    Plan: CM to continue to follow for discharge needs.    Care Transition Team Assessment    Information Source  Orientation Level: Oriented X4  Information Given By: Patient  Informant's Name:  (Bridget Soto)  Who is responsible for making decisions for patient? : Patient    Readmission Evaluation  Is this a readmission?: No    Elopement Risk  Legal Hold: No  Ambulatory or Self Mobile in Wheelchair: Yes  Disoriented: No  Psychiatric Symptoms: None  History of Wandering: No  Elopement this Admit: No  Vocalizing Wanting to Leave: No  Displays Behaviors, Body Language Wanting to Leave: No-Not at Risk for Elopement  Elopement Risk: Not at Risk for Elopement    Interdisciplinary Discharge Planning  Does Admitting Nurse Feel This Could be a Complex Discharge?: No  Primary Care Physician:  (Dr. Hernandez, 2 weeks.)  Lives with - Patient's Self Care Capacity: Spouse  Patient or legal guardian wants to designate a caregiver: No  Support Systems: Children, Spouse / Significant Other  Housing / Facility: 1 Story House  Able to Return to Previous ADL's: Yes  Mobility Issues: No  Prior Services: None  Patient Prefers to be Discharged to::  (Home with spouse.)  Durable Medical Equipment: Walker, Other - Specify (Has FWW, 4WW, wheelchair, toilet seat riser and hospital  bed)    Discharge Preparedness  What is your plan after discharge?: Home with help  What are your discharge supports?: Child, Spouse  Prior Functional Level: Ambulatory, Independent with Activities of Daily Living  Difficulity with ADLs: None  Difficulity with IADLs: None    Functional Assesment  Prior Functional Level: Ambulatory, Independent with Activities of Daily Living    Finances  Financial Barriers to Discharge: No  Prescription Coverage: Yes    Vision / Hearing Impairment  Right Eye Vision: Impaired, Wears Glasses  Left Eye Vision: Impaired, Wears Glasses    Values / Beliefs / Concerns  Values / Beliefs Concerns : No    Advance Directive  Advance Directive?: None    Domestic Abuse  Have you ever been the victim of abuse or violence?: No  Physical Abuse or Sexual Abuse: No  Verbal Abuse or Emotional Abuse: No  Possible Abuse/Neglect Reported to:: Not Applicable    Psychological Assessment  History of Substance Abuse: None  History of Psychiatric Problems: No    Discharge Risks or Barriers  Discharge risks or barriers?: No

## 2021-07-20 NOTE — ASSESSMENT & PLAN NOTE
Hemoglobin A1c ordered and pending  Consider daily aspirin  Continue home insulin regimen  Low SSI with meals  Follow-up outpatient PCP and endocrinology.

## 2021-07-20 NOTE — CARE PLAN
The patient is Watcher - Medium risk of patient condition declining or worsening    Clinical Goals: Infection Control  Progress made toward(s) clinical / shift goals: Patient receives IV abx per MAR. Standard precautions in place.      Patient is not progressing towards the following goals:    Problem: Fluid Volume  Goal: Fluid volume balance will be maintained  Outcome: Progressing   Patient is currently running LR at 75 ml/hr.  Patient drinking adequate amount of fluids PO.

## 2021-07-20 NOTE — ASSESSMENT & PLAN NOTE
Follow-up outpatient oncology, PCP after discharge.  We will hold her chemotherapeutic agent at this time for infection.

## 2021-07-20 NOTE — ED TRIAGE NOTES
Chief Complaint   Patient presents with   • Fever     pt states she had a 101 fever at home, pt has multiple myeloma, had a bone marrow transplant in october.    • Difficulty Urinating     pt states for a week she has been having difficulting urinating, pt states hx of utis.    • Painful Urination   • Diarrhea     pt states this has been happening for a month       Pt in wheelchair to triage. Pt alert and oriented. Pt takes she took 2 aspirin at 730pm. Pt educated on triage process and to update staff if any changes. Pt placed back into [facility].

## 2021-07-20 NOTE — ASSESSMENT & PLAN NOTE
Likely secondary to her chemotherapeutic agent we will hold off use of this in hospital at this time.  CBC in a.m.

## 2021-07-20 NOTE — ASSESSMENT & PLAN NOTE
This is Sepsis Present on admission  SIRS criteria identified on my evaluation include: Hypothermia, with temperature less than 96.8 deg F, Tachycardia, with heart rate greater than 90 BPM and Leukopenia, with WBC less than 4,000  Source is Urinary  Sepsis protocol initiated  Fluid resuscitation ordered per protocol  IV antibiotics as appropriate for source of sepsis  While organ dysfunction may be noted elsewhere in this problem list or in the chart, degree of organ dysfunction does not meet CMS criteria for severe sepsis  Procalcitonin ordered and pending, urine culture ordered and pending.  IV antibiotics initiated in ED and we will continue onward.

## 2021-07-20 NOTE — ASSESSMENT & PLAN NOTE
Azithromycin given in ED  Procalcitonin ordered pending  Chest x-ray reveals ill-defined opacity in right costophrenic border

## 2021-07-20 NOTE — ED NOTES
Attempted to call floor to give report. Charge RN request to call back in 10 to allow time to assign patient

## 2021-07-21 ENCOUNTER — PHARMACY VISIT (OUTPATIENT)
Dept: PHARMACY | Facility: MEDICAL CENTER | Age: 71
End: 2021-07-21
Payer: COMMERCIAL

## 2021-07-21 VITALS
TEMPERATURE: 96.8 F | BODY MASS INDEX: 38.32 KG/M2 | HEIGHT: 65 IN | OXYGEN SATURATION: 94 % | SYSTOLIC BLOOD PRESSURE: 131 MMHG | HEART RATE: 80 BPM | DIASTOLIC BLOOD PRESSURE: 62 MMHG | WEIGHT: 230 LBS | RESPIRATION RATE: 17 BRPM

## 2021-07-21 LAB
ANION GAP SERPL CALC-SCNC: 12 MMOL/L (ref 7–16)
BASOPHILS # BLD AUTO: 0.9 % (ref 0–1.8)
BASOPHILS # BLD: 0.02 K/UL (ref 0–0.12)
BUN SERPL-MCNC: 9 MG/DL (ref 8–22)
CALCIUM SERPL-MCNC: 8.6 MG/DL (ref 8.5–10.5)
CHLORIDE SERPL-SCNC: 108 MMOL/L (ref 96–112)
CO2 SERPL-SCNC: 20 MMOL/L (ref 20–33)
CREAT SERPL-MCNC: 0.81 MG/DL (ref 0.5–1.4)
EOSINOPHIL # BLD AUTO: 0.2 K/UL (ref 0–0.51)
EOSINOPHIL NFR BLD: 9.3 % (ref 0–6.9)
ERYTHROCYTE [DISTWIDTH] IN BLOOD BY AUTOMATED COUNT: 56.4 FL (ref 35.9–50)
GLUCOSE BLD-MCNC: 108 MG/DL (ref 65–99)
GLUCOSE BLD-MCNC: 89 MG/DL (ref 65–99)
GLUCOSE SERPL-MCNC: 128 MG/DL (ref 65–99)
HCT VFR BLD AUTO: 36.4 % (ref 37–47)
HGB BLD-MCNC: 11.9 G/DL (ref 12–16)
IMM GRANULOCYTES # BLD AUTO: 0.02 K/UL (ref 0–0.11)
IMM GRANULOCYTES NFR BLD AUTO: 0.9 % (ref 0–0.9)
LYMPHOCYTES # BLD AUTO: 0.56 K/UL (ref 1–4.8)
LYMPHOCYTES NFR BLD: 25.9 % (ref 22–41)
MAGNESIUM SERPL-MCNC: 2.4 MG/DL (ref 1.5–2.5)
MCH RBC QN AUTO: 32.1 PG (ref 27–33)
MCHC RBC AUTO-ENTMCNC: 32.7 G/DL (ref 33.6–35)
MCV RBC AUTO: 98.1 FL (ref 81.4–97.8)
MONOCYTES # BLD AUTO: 0.27 K/UL (ref 0–0.85)
MONOCYTES NFR BLD AUTO: 12.5 % (ref 0–13.4)
NEUTROPHILS # BLD AUTO: 1.09 K/UL (ref 2–7.15)
NEUTROPHILS NFR BLD: 50.5 % (ref 44–72)
NRBC # BLD AUTO: 0 K/UL
NRBC BLD-RTO: 0 /100 WBC
PLATELET # BLD AUTO: 95 K/UL (ref 164–446)
PMV BLD AUTO: 11 FL (ref 9–12.9)
POTASSIUM SERPL-SCNC: 3.5 MMOL/L (ref 3.6–5.5)
RBC # BLD AUTO: 3.71 M/UL (ref 4.2–5.4)
SODIUM SERPL-SCNC: 140 MMOL/L (ref 135–145)
WBC # BLD AUTO: 2.2 K/UL (ref 4.8–10.8)

## 2021-07-21 PROCEDURE — 36415 COLL VENOUS BLD VENIPUNCTURE: CPT

## 2021-07-21 PROCEDURE — 85025 COMPLETE CBC W/AUTO DIFF WBC: CPT

## 2021-07-21 PROCEDURE — 80048 BASIC METABOLIC PNL TOTAL CA: CPT

## 2021-07-21 PROCEDURE — 99239 HOSP IP/OBS DSCHRG MGMT >30: CPT | Performed by: INTERNAL MEDICINE

## 2021-07-21 PROCEDURE — A9270 NON-COVERED ITEM OR SERVICE: HCPCS | Performed by: INTERNAL MEDICINE

## 2021-07-21 PROCEDURE — 700111 HCHG RX REV CODE 636 W/ 250 OVERRIDE (IP): Performed by: STUDENT IN AN ORGANIZED HEALTH CARE EDUCATION/TRAINING PROGRAM

## 2021-07-21 PROCEDURE — 82962 GLUCOSE BLOOD TEST: CPT | Mod: 91

## 2021-07-21 PROCEDURE — 700102 HCHG RX REV CODE 250 W/ 637 OVERRIDE(OP): Performed by: INTERNAL MEDICINE

## 2021-07-21 PROCEDURE — RXMED WILLOW AMBULATORY MEDICATION CHARGE: Performed by: INTERNAL MEDICINE

## 2021-07-21 PROCEDURE — 83735 ASSAY OF MAGNESIUM: CPT

## 2021-07-21 RX ORDER — POLYETHYLENE GLYCOL 3350 17 G/17G
17 POWDER, FOR SOLUTION ORAL
Qty: 119 G | Refills: 1 | Status: SHIPPED | OUTPATIENT
Start: 2021-07-21

## 2021-07-21 RX ORDER — POTASSIUM CHLORIDE 20 MEQ/1
40 TABLET, EXTENDED RELEASE ORAL ONCE
Status: COMPLETED | OUTPATIENT
Start: 2021-07-21 | End: 2021-07-21

## 2021-07-21 RX ORDER — ACETAMINOPHEN 325 MG/1
650 TABLET ORAL EVERY 6 HOURS PRN
COMMUNITY
Start: 2021-07-21

## 2021-07-21 RX ORDER — AMOXICILLIN 250 MG
2 CAPSULE ORAL 2 TIMES DAILY PRN
Qty: 40 TABLET | Refills: 1 | Status: SHIPPED | OUTPATIENT
Start: 2021-07-21

## 2021-07-21 RX ORDER — CEFDINIR 300 MG/1
300 CAPSULE ORAL 2 TIMES DAILY
Qty: 20 CAPSULE | Refills: 0 | Status: SHIPPED | OUTPATIENT
Start: 2021-07-21

## 2021-07-21 RX ADMIN — POTASSIUM CHLORIDE 40 MEQ: 1500 TABLET, EXTENDED RELEASE ORAL at 16:02

## 2021-07-21 RX ADMIN — ENOXAPARIN SODIUM 40 MG: 40 INJECTION SUBCUTANEOUS at 07:14

## 2021-07-21 ASSESSMENT — PAIN DESCRIPTION - PAIN TYPE
TYPE: ACUTE PAIN

## 2021-07-21 NOTE — DISCHARGE PLANNING
Meds-to-Beds: Discharge prescription orders listed below delivered to patient's bedside. RN Mira notified. Patient counseled.       Charles Bridget Juanrafael   Home Medication Instructions [id number]    Printed on:07/21/21 1611   Medication Information                      cefdinir (OMNICEF) 300 MG Cap  Take 1 capsule by mouth 2 times a day.             polyethylene glycol 3350 (MIRALAX) 17 GM/SCOOP Powder  Mix 17 g with liquid and drink 1 time a day as needed (if sennosides and docusate ineffective after 24 hours).             senna-docusate (PERICOLACE OR SENOKOT S) 8.6-50 MG Tab  Take 2 Tablets by mouth 2 times a day as needed for Constipation.                 Sangeeta Avendano, PharmD

## 2021-07-21 NOTE — HOSPITAL COURSE
Bridget Soto is a 71 y.o. female with Hx multiple myeloma treated in [place], and chronic thrombocytopenia who presented 7/19/2021 with complaints of dysuria, fever of 101 at home and dehydration.  She was hypothermic, tachycardic, and leukopenic on presentation. She was admitted and treated for sepsis per protocol, including ceftriaxone for UTI. Her symptoms resolved and vital signs normalized.

## 2021-07-21 NOTE — PROGRESS NOTES
[facility] Medicine Daily Progress Note    Date of Service  7/20/2021    Chief Complaint  Bridget Soto is a 71 y.o. female admitted 7/19/2021 with fever, dysuria.    Hospital Course  Bridget Soto is a 71 y.o. female who presented 7/19/2021 with complaints of dysuria, fever of 101 at home and dehydration.  Patient states that she has been complaining of fever and chills which brought her into the hospital.  She states she has a history of multiple myeloma and is treated in [place].  She has not been taking any recent antibiotic therapy.  Patient states that she has been vaccinated x2 for COVID-19.  She denies any anosmia, ageusia, night sweats, cough with sputum production however does admit to shortness of breath.  She had chest x-ray performed and reveals hazy interstitial right midlung density suggests subtle infiltrate.  She denies cough with sputum production and hemoptysis.  She states that she has low platelets chronically from her multiple myeloma treatment.  She otherwise denies at time of evaluation: Constipation, melena, hematochezia, hematuria, incoordination, vertigo, syncope, visual changes.  Patient does complain of sore on the right cheek on the inside of her mouth  (see picture below), denies any trauma to inside of mouth.      Interval Problem Update  Feeling better today. Reports slight but improved dysuria.    I have personally seen and examined the patient at bedside. I discussed the plan of care with patient, family, bedside RN, charge RN and .    Consultants/Specialty  none    Code Status  Full Code    Disposition  Patient is not medically cleared.   Anticipate discharge to to home with close outpatient follow-up.  I have placed the appropriate orders for post-discharge needs.    Review of Systems  ROS     Physical Exam  Temp:  [35.8 °C (96.5 °F)-37.1 °C (98.7 °F)] 37.1 °C (98.7 °F)  Pulse:  [65-96] 80  Resp:  [16-19] 16  BP: (111-122)/(59-84) 122/71  SpO2:  [94 %-96 %]  94 %    Physical Exam    Fluids    Intake/Output Summary (Last 24 hours) at 7/20/2021 2345  Last data filed at 7/20/2021 2130  Gross per 24 hour   Intake 600 ml   Output --   Net 600 ml       Laboratory  Recent Labs     07/19/21 2127 07/20/21  0135   WBC 4.0* 2.8*   RBC 3.98* 3.57*   HEMOGLOBIN 12.8 11.3*   HEMATOCRIT 37.6 34.6*   MCV 94.5 96.9   MCH 32.2 31.7   MCHC 34.0 32.7*   RDW 54.5* 56.9*   PLATELETCT 102* 90*   MPV 11.6 11.6     Recent Labs     07/19/21 2127 07/20/21  0135   SODIUM 136 142   POTASSIUM 3.7 3.5*   CHLORIDE 102 108   CO2 16* 20   GLUCOSE 133* 137*   BUN 13 12   CREATININE 0.94 0.85   CALCIUM 9.0 8.6     Recent Labs     07/20/21  0631   INR 1.07               Imaging  DX-CHEST-PORTABLE (1 VIEW)   Final Result         1.  Hazy interstitial right midlung density suggests subtle infiltrate.           Assessment/Plan  * Sepsis (HCC)- (present on admission)  Assessment & Plan  This is Sepsis Present on admission  SIRS criteria identified on my evaluation include: Hypothermia, with temperature less than 96.8 deg F, Tachycardia, with heart rate greater than 90 BPM and Leukopenia, with WBC less than 4,000  Source is Urinary  Sepsis protocol initiated  Fluid resuscitation ordered per protocol  IV antibiotics as appropriate for source of sepsis  While organ dysfunction may be noted elsewhere in this problem list or in the chart, degree of organ dysfunction does not meet CMS criteria for severe sepsis  Procalcitonin ordered and pending, urine culture ordered and pending.  IV antibiotics initiated in ED and we will continue onward.        Shortness of breath- (present on admission)  Assessment & Plan  Azithromycin given in ED  Procalcitonin ordered pending  Chest x-ray reveals ill-defined opacity in right costophrenic border    Obesity (BMI 35.0-39.9 without comorbidity)- (present on admission)  Assessment & Plan  Recommend lifestyle modifications and follow-up with PCP for diet and exercise  regimen.    Type 2 diabetes mellitus with diabetic polyneuropathy, with long-term current use of insulin (HCC)- (present on admission)  Assessment & Plan  Hemoglobin A1c ordered and pending  Consider daily aspirin  Continue home insulin regimen  Low SSI with meals  Follow-up outpatient PCP and endocrinology.    Transaminitis- (present on admission)  Assessment & Plan  Possibly secondary from sepsis and we will trend CMP in a.m.    Metabolic acidosis- (present on admission)  Assessment & Plan  Likely from lactic acidosis of 2.8.  Lactated Ringer infusion  CMP in a.m.    Leukopenia- (present on admission)  Assessment & Plan  Likely secondary to her chemotherapeutic agent we will hold off use of this in hospital at this time.  CBC in a.m.    Thrombocytopenia (HCC)- (present on admission)  Assessment & Plan  Likely secondary to her chemotherapeutic agent and we will hold off at this time  CBC in a.m.    Multiple myeloma (HCC)- (present on admission)  Assessment & Plan  Follow-up outpatient oncology, PCP after discharge.  We will hold her chemotherapeutic agent at this time for infection.    Acute cystitis- (present on admission)  Assessment & Plan  Urinalysis reveals significant pyuria and moderate bacteria on microscopic analysis consistent with symptomatic dysuria and infection  IV antibiotics initiated in ED  Urine culture ordered and pending result         VTE prophylaxis: SCDs/TEDs and enoxaparin ppx    I have performed a physical exam and reviewed and updated ROS and Plan today (7/20/2021). In review of yesterday's note (7/19/2021), there are no changes except as documented above.

## 2021-07-21 NOTE — DISCHARGE INSTRUCTIONS
Discharge Instructions    Discharged to home by car with relative. Discharged via wheelchair, hospital escort: Yes.  Special equipment needed: Not Applicable    Be sure to schedule a follow-up appointment with your primary care doctor or any specialists as instructed.     Discharge Plan:   Diet Plan: Discussed  Activity Level: Discussed  Confirmed Follow up Appointment: Patient to Call and Schedule Appointment  Confirmed Symptoms Management: Discussed  Medication Reconciliation Updated: Yes    I understand that a diet low in cholesterol, fat, and sodium is recommended for good health. Unless I have been given specific instructions below for another diet, I accept this instruction as my diet prescription.   Other diet: Regular    Special Instructions:   Sepsis, Diagnosis, Adult  Sepsis is a serious bodily reaction to an infection. The infection that triggers sepsis may be from a bacteria, virus, or fungus. Sepsis can result from an infection in any part of your body. Infections that commonly lead to sepsis include skin, lung, and urinary tract infections.  Sepsis is a medical emergency that must be treated right away in a hospital. In severe cases, it can lead to septic shock. Septic shock can weaken your heart and cause your blood pressure to drop. This can cause your central nervous system and your body's organs to stop working.  What are the causes?  This condition is caused by a severe reaction to infections from bacteria, viruses, or fungus. The germs that most often lead to sepsis include:  · Escherichia coli (E. coli) bacteria.  · Staphylococcus aureus (staph) bacteria.  · Some types of Streptococcus bacteria.  The most common infections affect these organs:  · The lung (pneumonia).  · The kidneys or bladder (urinary tract infection).  · The skin (cellulitis).  · The bowel, gallbladder, or pancreas.  What increases the risk?  You are more likely to develop this condition if:  · Your body's disease-fighting  system (immune system) is weakened.  · You are age 65 or older.  · You are male.  · You had surgery or you have been hospitalized.  · You have these devices inserted into your body:  ? A small, thin tube (catheter).  ? IV line.  ? Breathing tube.  ? Drainage tube.  · You are not getting enough nutrients from food (malnourished).  · You have a long-term (chronic) disease, such as cancer, lung disease, kidney disease, or diabetes.  · You are .  What are the signs or symptoms?  Symptoms of this condition may include:  · Fever.  · Chills or feeling very cold.  · Confusion or anxiety.  · Fatigue.  · Muscle aches.  · Shortness of breath.  · Nausea and vomiting.  · Urinating much less than usual.  · Fast heart rate (tachycardia).  · Rapid breathing (hyperventilation).  · Changes in skin color. Your skin may look blotchy, pale, or blue.  · Cool, clammy, or sweaty skin.  · Skin rash.  Other symptoms depend on the source of your infection.  How is this diagnosed?  This condition is diagnosed based on:  · Your symptoms.  · Your medical history.  · A physical exam.  Other tests may also be done to find out the cause of the infection and how severe the sepsis is. These tests may include:  · Blood tests.  · Urine tests.  · Swabs from other areas of your body that may have an infection. These samples may be tested (cultured) to find out what type of bacteria is causing the infection.  · Chest X-ray to check for pneumonia. Other imaging tests, such as a CT scan, may also be done.  · Lumbar puncture. This removes a small amount of the fluid that surrounds your brain and spinal cord. The fluid is then examined for infection.  How is this treated?  This condition must be treated in a hospital. Based on the cause of your infection, you may be given an antibiotic, antiviral, or antifungal medicine.  You may also receive:  · Fluids through an IV.  · Oxygen and breathing assistance.  · Medicines to increase your blood  pressure.  · Kidney dialysis. This process cleans your blood if your kidneys have failed.  · Surgery to remove infected tissue.  · Blood transfusion if needed.  · Medicine to prevent blood clots.  · Nutrients to correct imbalances in basic body function (metabolism). You may:  ? Receive important salts and minerals (electrolytes) through an IV.  ? Have your blood sugar level adjusted.  Follow these instructions at home:  Medicines    · Take over-the-counter and prescription medicines only as told by your health care provider.  · If you were prescribed an antibiotic, antiviral, or antifungal medicine, take it as told by your health care provider. Do not stop taking the medicine even if you start to feel better.  General instructions  · If you have a catheter or other indwelling device, ask to have it removed as soon as possible.  · Keep all follow-up visits as told by your health care provider. This is important.  Contact a health care provider if:  · You do not feel like you are getting better or regaining strength.  · You are having trouble coping with your recovery.  · You frequently feel tired.  · You feel worse or do not seem to get better after surgery.  · You think you may have an infection after surgery.  Get help right away if:  · You have any symptoms of sepsis.  · You have difficulty breathing.  · You have a rapid or skipping heartbeat.  · You become confused or disoriented.  · You have a high fever.  · Your skin becomes blotchy, pale, or blue.  · You have an infection that is getting worse or not getting better.  These symptoms may represent a serious problem that is an emergency. Do not wait to see if the symptoms will go away. Get medical help right away. Call your local emergency services (911 in the [place] Do not drive yourself to the hospital.  Summary  · Sepsis is a medical emergency that requires immediate treatment in a hospital.  · This condition is caused by a severe reaction to infections from  bacteria, viruses, or fungus.  · Based on the cause of your infection, you may be given an antibiotic, antiviral, or antifungal medicine.  · Treatment may also include IV fluids, breathing assistance, and kidney dialysis.  This information is not intended to replace advice given to you by your health care provider. Make sure you discuss any questions you have with your health care provider.  Document Released: 09/15/2004 Document Revised: 07/26/2019 Document Reviewed: 07/26/2019  [employer] Patient Education © 2020 [employer]      · Is patient discharged on Warfarin / Coumadin?   No     Depression / Suicide Risk    As you are discharged from this [facility] facility, it is important to learn how to keep safe from harming yourself.    Recognize the warning signs:  · Abrupt changes in personality, positive or negative- including increase in energy   · Giving away possessions  · Change in eating patterns- significant weight changes-  positive or negative  · Change in sleeping patterns- unable to sleep or sleeping all the time   · Unwillingness or inability to communicate  · Depression  · Unusual sadness, discouragement and loneliness  · Talk of wanting to die  · Neglect of personal appearance   · Rebelliousness- reckless behavior  · Withdrawal from people/activities they love  · Confusion- inability to concentrate     If you or a loved one observes any of these behaviors or has concerns about self-harm, here's what you can do:  · Talk about it- your feelings and reasons for harming yourself  · Remove any means that you might use to hurt yourself (examples: pills, rope, extension cords, firearm)  · Get professional help from the community (Mental Health, Substance Abuse, psychological counseling)  · Do not be alone:Call your Safe Contact- someone whom you trust who will be there for you.  · Call your local CRISIS HOTLINE 586-0903 or 941-413-3513  · Call your local Children's Mobile Crisis Response Team [place] (680) 606-2540 or www.Flyfit  · Call the toll free National Suicide Prevention Hotlines   · National Suicide Prevention Lifeline 233-629-RSOK (4726)  · National Hope Line Network 800-SUICIDE (585-4295)      Cefdinir capsules  What is this medicine?  CEFDINIR (SEF di ner) is a cephalosporin antibiotic. It is used to treat certain kinds of bacterial infections. It will not work for colds, flu, or other viral infections.  This medicine may be used for other purposes; ask your health care provider or pharmacist if you have questions.  COMMON BRAND NAME(S): Ting  What should I tell my health care provider before I take this medicine?  They need to know if you have any of these conditions:  · bleeding problems  · kidney disease  · stomach or intestine problems (especially colitis)  · an unusual or allergic reaction to cefdinir, other cephalosporin antibiotics, penicillin, penicillamine, other foods, dyes or preservatives  · pregnant or trying to get pregnant  · breast-feeding  How should I use this medicine?  Take this medicine by mouth. Swallow it with a drink of water. Follow the directions on the prescription label. You can take it with or without food. If it upsets your stomach it may help to take it with food. Take your doses at regular intervals. Do not take it more often than directed. Finish all the medicine you are prescribed even if you think your infection is better.  Talk to your pediatrician regarding the use of this medicine in children. Special care may be needed.  Overdosage: If you think you have taken too much of this medicine contact a poison control center or emergency room at once.  NOTE: This medicine is only for you. Do not share this medicine with others.  What if I miss a dose?  If you miss a dose, take it as soon as you can. If it is almost time for your next dose, take only that dose. Do not take double or extra doses.  What may interact with this medicine?  · antacids that  contain aluminum or magnesium  · iron supplements  · other antibiotics  · probenecid  This list may not describe all possible interactions. Give your health care provider a list of all the medicines, herbs, non-prescription drugs, or dietary supplements you use. Also tell them if you smoke, drink alcohol, or use illegal drugs. Some items may interact with your medicine.  What should I watch for while using this medicine?  Tell your doctor or health care provider if your symptoms do not get better in a few days.  This medicine may cause serious skin reactions. They can happen weeks to months after starting the medicine. Contact your health care provider right away if you notice fevers or flu-like symptoms with a rash. The rash may be red or purple and then turn into blisters or peeling of the skin. Or, you might notice a red rash with swelling of the face, lips or lymph nodes in your neck or under your arms.  If you are diabetic you may get a false-positive result for sugar in your urine. Check with your doctor or health care provider before you change your diet or the dose of your diabetes medicine.  What side effects may I notice from receiving this medicine?  Side effects that you should report to your doctor or health care professional as soon as possible:  · allergic reactions like skin rash, itching or hives, swelling of the face, lips, or tongue  · bloody or watery diarrhea  · breathing problems  · fever  · redness, blistering, peeling or loosening of the skin, including inside the mouth  · seizures  · trouble passing urine or change in the amount of urine  · unusual bleeding or bruising  · unusually weak or tired  Side effects that usually do not require medical attention (report to your doctor or health care professional if they continue or are bothersome):  · constipation  · diarrhea  · dizziness  · dry mouth  · headache  · loss of appetite  · nausea, vomiting  · stomach pain  · stool  discoloration  · tiredness  · vaginal discharge, itching, or odor in women  This list may not describe all possible side effects. Call your doctor for medical advice about side effects. You may report side effects to FDA at 7-414-AWC-9227.  Where should I keep my medicine?  Keep out of the reach of children.  Store at room temperature between 15 and 30 degrees C (59 and 86 degrees F). Throw the medicine away after the expiration date.  NOTE: This sheet is a summary. It may not cover all possible information. If you have questions about this medicine, talk to your doctor, pharmacist, or health care provider.  © 2020 [employer] (2020-03-13 08:49:20)

## 2021-07-21 NOTE — PROGRESS NOTES
"/62   Pulse 80   Temp 36 °C (96.8 °F) (Temporal)   Resp 17   Ht 1.651 m (5' 5\")   Wt 104 kg (230 lb)   SpO2 94%     Pt AAOx4, VSS on room air. Discharge education provided to pt and spouse, discussed follow-up appointments and medications. No questions or concerns at this time. Pt discharged home with belongings, IV removed.  "

## 2021-07-21 NOTE — DISCHARGE SUMMARY
Discharge Summary    CHIEF COMPLAINT ON ADMISSION  Chief Complaint   Patient presents with   • Fever     pt states she had a 101 fever at home, pt has multiple myeloma, had a bone marrow transplant in october.    • Difficulty Urinating     pt states for a week she has been having difficulting urinating, pt states hx of utis.    • Painful Urination   • Diarrhea     pt states this has been happening for a month       Reason for Admission  Flu Like Symptoms, Cancer     Admission Date  7/19/2021    CODE STATUS  Full Code    HPI & HOSPITAL COURSE  Bridget Soto is a 71 y.o. female with Hx multiple myeloma treated in [place], and chronic thrombocytopenia who presented 7/19/2021 with complaints of dysuria, fever of 101 at home and dehydration.  She was hypothermic, tachycardic, and leukopenic on presentation. She was admitted and treated for sepsis per protocol, including ceftriaxone for UTI. Her symptoms resolved and vital signs normalized.      Therefore, she is discharged in good and stable condition to home with close outpatient follow-up.    The patient met 2-midnight criteria for an inpatient stay at the time of discharge.    Discharge Date  7/21/2021    FOLLOW UP ITEMS POST DISCHARGE  Follow up with Oncologist for MM  Follow up with PCP for UTI    DISCHARGE DIAGNOSES  Principal Problem:    Sepsis (HCC) POA: Yes  Active Problems:    Acute cystitis POA: Yes    Multiple myeloma (HCC) POA: Yes    Thrombocytopenia (HCC) POA: Yes    Leukopenia POA: Yes    Metabolic acidosis POA: Yes    Transaminitis POA: Yes    Type 2 diabetes mellitus with diabetic polyneuropathy, with long-term current use of insulin (HCC) POA: Yes    Obesity (BMI 35.0-39.9 without comorbidity) POA: Yes    Shortness of breath POA: Yes  Resolved Problems:    Class 2 obesity due to excess calories without serious comorbidity with body mass index (BMI) of 35.0 to 35.9 in adult POA: Yes      FOLLOW UP  [facility]  [address] [place]  [address]  [phone]    Schedule an appointment as soon as possible for a visit in 1 week  PCP follow-up      MEDICATIONS ON DISCHARGE     Medication List      START taking these medications      Instructions   acetaminophen 325 MG Tabs  Commonly known as: Tylenol   Take 2 Tablets by mouth every 6 hours as needed.  Dose: 650 mg     cefdinir 300 MG Caps  Commonly known as: OMNICEF   Take 1 capsule by mouth 2 times a day.  Dose: 300 mg     polyethylene glycol 3350 17 GM/SCOOP Powd  Commonly known as: MIRALAX   Mix 17 g with liquid and drink 1 time a day as needed (if sennosides and docusate ineffective after 24 hours).  Dose: 17 g     Senna Plus 8.6-50 MG Tabs  Generic drug: senna-docusate   Take 2 Tablets by mouth 2 times a day as needed for Constipation.  Dose: 2 tablet        CONTINUE taking these medications      Instructions   CALCIUM MAGNESIUM PO   Take 1 tablet by mouth 2 times a day.  Dose: 1 tablet     gabapentin 300 MG Caps  Commonly known as: NEURONTIN   Take 300 mg by mouth at bedtime.  Dose: 300 mg     insulin lispro 100 UNIT/ML  Commonly known as: HumaLOG   Inject 3 Units under the skin 4 times a day as needed for High Blood Sugar. For FSBS > 120  Dose: 3 Units     Lantus 100 UNIT/ML Soln  Generic drug: insulin glargine   Inject 7 Units under the skin every evening.  Dose: 7 Units     letrozole 2.5 MG Tabs  Commonly known as: FEMARA   Take 2.5 mg by mouth every evening.  Dose: 2.5 mg     Revlimid 10 MG Caps  Generic drug: Lenalidomide   Take 10 mg by mouth at bedtime.  Dose: 10 mg     vitamin D 1000 Unit (25 mcg) Tabs  Commonly known as: cholecalciferol   Take 1,000 Units by mouth every evening.  Dose: 1,000 Units            Allergies  Allergies   Allergen Reactions   • Sulfa Drugs      rash       DIET  Orders Placed This Encounter   Procedures   • Diet Order Diet: Consistent CHO (Diabetic)     Standing Status:   Standing     Number of Occurrences:   1     Order Specific Question:   Diet:     Answer:    Consistent CHO (Diabetic) [4]       ACTIVITY  As tolerated.  Weight bearing as tolerated    CONSULTATIONS  None     PROCEDURES  None     LABORATORY  Lab Results   Component Value Date    SODIUM 140 07/21/2021    POTASSIUM 3.5 (L) 07/21/2021    CHLORIDE 108 07/21/2021    CO2 20 07/21/2021    GLUCOSE 128 (H) 07/21/2021    BUN 9 07/21/2021    CREATININE 0.81 07/21/2021        Lab Results   Component Value Date    WBC 2.2 (LL) 07/21/2021    HEMOGLOBIN 11.9 (L) 07/21/2021    HEMATOCRIT 36.4 (L) 07/21/2021    PLATELETCT 95 (L) 07/21/2021        Total time of the discharge process exceeds 35 minutes.

## 2021-07-21 NOTE — CARE PLAN
The patient is Stable - Low risk of patient condition declining or worsening     Clinical goals: Infection control    Progress made toward(s) clinical / shift goals:  IV antibiotics per MAR. Pt afebrile, VSS.    Patient is not progressing towards the following goals: none      Problem: Knowledge Deficit - Standard  Goal: Patient and family/care givers will demonstrate understanding of plan of care, disease process/condition, diagnostic tests and medications  Outcome: Progressing

## 2021-07-21 NOTE — PROGRESS NOTES
Lab called with critical result of WBC 2.2 at 1423. Critical lab result read back to lab.   Dr. Maegan Raymond notified of critical lab result at 1426.  Critical lab result read back by Dr. Raymond.

## 2021-07-21 NOTE — CARE PLAN
The patient is Stable - Low risk of patient condition declining or worsening         Progress made toward(s) clinical / shift goals:  Patient to be discharged today.     Patient is not progressing towards the following goals:

## 2021-07-22 LAB
BACTERIA UR CULT: ABNORMAL
BACTERIA UR CULT: ABNORMAL
SIGNIFICANT IND 70042: ABNORMAL
SITE SITE: ABNORMAL
SOURCE SOURCE: ABNORMAL

## 2021-07-24 LAB
BACTERIA BLD CULT: NORMAL
SIGNIFICANT IND 70042: NORMAL
SITE SITE: NORMAL
SOURCE SOURCE: NORMAL

## 2021-07-25 LAB
BACTERIA BLD CULT: NORMAL
SIGNIFICANT IND 70042: NORMAL
SITE SITE: NORMAL
SOURCE SOURCE: NORMAL

## 2022-07-22 NOTE — PROGRESS NOTES
4 Eyes Skin Assessment Completed by LAVELL Mckee and LAVELL Moesr.    Head WDL  Ears WDL  Nose WDL  Mouth WDL  Neck WDL  Breast/Chest WDL  Shoulder Blades WDL  Spine WDL  (R) Arm/Elbow/Hand WDL  (L) Arm/Elbow/Hand WDL  Abdomen WDL  Groin WDL  Scrotum/Coccyx/Buttocks WDL  (R) Leg Swelling  (L) Leg Swelling  (R) Heel/Foot/Toe WDL  (L) Heel/Foot/Toe WDL    Devices In Places Pulse Ox    Interventions In Place Waffle Overlay, Pillows and Pressure Redistribution Mattress    Possible Skin Injury No    Pictures Uploaded Into Epic N/A  Wound Consult Placed N/A  RN Wound Prevention Protocol Ordered No      Ears: no ear pain and no hearing problems. Nose: no nasal congestion and no nasal drainage. Mouth/Throat: no dysphagia, no hoarseness and no throat pain. Neck: no lumps, no pain, no stiffness and no swollen glands.